# Patient Record
Sex: FEMALE
[De-identification: names, ages, dates, MRNs, and addresses within clinical notes are randomized per-mention and may not be internally consistent; named-entity substitution may affect disease eponyms.]

---

## 2019-04-17 ENCOUNTER — HOSPITAL ENCOUNTER (INPATIENT)
Dept: HOSPITAL 80 - FLD | Age: 39
LOS: 2 days | Discharge: HOME | DRG: 560 | End: 2019-04-19
Attending: ADVANCED PRACTICE MIDWIFE | Admitting: ADVANCED PRACTICE MIDWIFE
Payer: MEDICAID

## 2019-04-17 DIAGNOSIS — Z3A.39: ICD-10-CM

## 2019-04-17 NOTE — PDGENHP
History and Physical


History and Physical: 





PRENATAL CARE: [facility]





HPI: 


 Patient is a 38 yo G 1 @ 39.4 weeks aga who presents to L&D in active labor 

after SROM yesterday - clear fluid - at 1430 yesterday afternoon.  She declined 

IOL and preferred for labor to start on its own.  She started having regular 

contractions this afternoon at 1330 and she quickly became very uncomfortable.  

She was leaking clear fluid with some bloody show noted.  EFM reactive - so 

monitor removed for intermittent monitoring.  SVE  station.  


 EDC: 19 which is based on LMP: 18 which is known and consistent with 

Ultrasound at 7 weeks.  


 Her pregnancy is complicated by:  


 - AMA 





Review of Systems:


 Constitutional: Denies any fever, chills, or fatigue


 HEENT: denies any visual changes, difficulty swallowing, hearing loss


 Cardiovascular: Denies any chest pain, palpitations, leg swelling


 Respiratory: denies any cough, wheezing, or shortness of breathe


 GI: Denies any nausea, vomiting, diarrhea, constipation


 : denies any dysuria, urgency, frequency, vaginal bleeding


 Musculoskeletal: denies any muscle or bone pain


 Skin: denies any rashes


 Neuro: denies any headache, seizures, lightheadedness, dizziness, or loss of 

consciousness


 Psychiatric: denies any depression, anxiety, or SI/HI thoughts





HISTORY: 


 Previous OB history:  none


 Past medical history:  ASCU pap with neg colpo  - will repeat pp.  


 Past surgical history: none


 Medications: PNV 


 Allergies (list reaction): NKDA





PRENATAL LABS:


 Rh: O pos


 ABS: Neg


 Rubella: Immune


 HbsAg: NR


 HIV: NR


 VDRL: NR


 1hr:  102


 GC: Neg


 Chlamydia: Neg 


 GBS:  neg 








PHYSICAL EXAM:


 Constitutional: WN, A&Ox3


 Skin: pink, warm, dry


 HEENT: normocephalic atraumatic, supple


 Heart: RRR, no murmur


 Chest: CTA-B


 Abdomen: Soft, nontender, gravid


 SVE:  station


 Extremities: tr edema, negative raegan's sign


 Neuro: grossly normal


 Psych: normal affect





Fetal assessment: Reassuring FHTs, baseline 120s +accels, no decels, moderate 

variability 


 Contractions: toco q 2-3





Assessment: 


 1) 38 yo G 1  with IUP@ 39.4 weeks ega


 2) active labor


 3) GBS neg 


 4) Cat 1  FHR tracing





Plan: 


 1) Admit to L&D


 2) intermittent monitoring per policy 


 3) Pain control as patient desires 


 4) Anticipate imminent

## 2019-04-17 NOTE — OBDEL
Birth Info


Birth Type: Vaginal


Presentation at Delivery: Vertex


L&D Analgesia/Anesthesia Type: None


GBS+: No


Indications for Delivery: Spontaneous Labor





Vaginal Delivery





- Delivery Provider


Delivery Physician/CNM: Radha Saleh





- Labor and Delivery


Onset of Contractions Date: 19


Onset of Contractions Time: 13:30


Onset of Contractions Type: Spontaneous


Rupture of Membranes Date: 19


Rupture of Membranes Time: 14:30


Rupture of Membranes Type: Spontaneous


Amniotic Fluid Color: Clear


Dilation Complete Date: 19


Dilation Complete Time: 15:43


Placenta Delivery Date: 19


Placenta Delivery Time: 16:48


Total Hours of Labor: 3


Vaginal Sponge Count Correct: Yes


Vaginal Needle Count Correct: Yes


Vaginal Sweep Performed: Yes


EBL: 150


Delivery Events: None





- Medications


Labor Augmentation/Induction Methods Used: None





 Birth Data


HEATHER: 19


Gestational Age: 39 week(s) and 4 day(s)


  ** [place]


Delivery Date: 19


Delivery Time: 16:42


Sex of Infant: Female


Apgar Score (1 Min): 8


Apgar Score (5 Min): 9





ICD10 Worksheet


Patient Problems: 


 Problems











Problem Status Onset


 


AMA (advanced maternal age) primigravida 35+ Acute  


 


Vaginal delivery Acute  














- ICD10 Problem Qualifiers


(1) Vaginal delivery








(2) AMA (advanced maternal age) primigravida 35+

## 2019-04-18 RX ADMIN — DOCUSATE SODIUM PRN MG: 100 CAPSULE, LIQUID FILLED ORAL at 20:15

## 2019-04-18 RX ADMIN — ACETAMINOPHEN PRN MG: 325 TABLET ORAL at 13:38

## 2019-04-18 RX ADMIN — ACETAMINOPHEN PRN MG: 325 TABLET ORAL at 00:03

## 2019-04-18 RX ADMIN — IBUPROFEN PRN MG: 600 TABLET ORAL at 00:03

## 2019-04-18 RX ADMIN — IBUPROFEN PRN MG: 600 TABLET ORAL at 06:12

## 2019-04-18 RX ADMIN — IBUPROFEN PRN MG: 600 TABLET ORAL at 20:15

## 2019-04-18 RX ADMIN — IBUPROFEN PRN MG: 600 TABLET ORAL at 13:38

## 2019-04-18 RX ADMIN — ACETAMINOPHEN PRN MG: 325 TABLET ORAL at 06:12

## 2019-04-18 RX ADMIN — DOCUSATE SODIUM PRN MG: 100 CAPSULE, LIQUID FILLED ORAL at 09:28

## 2019-04-18 RX ADMIN — ACETAMINOPHEN PRN MG: 325 TABLET ORAL at 20:14

## 2019-04-18 NOTE — OBPP
PostPartum Progress Note


Assessment/Plan: 


Assessment:





1. First PP day





2. 











Plan:


1. lactation support


2. D/C home tomorrow


19 09:11





Subjective/Postpartum Course: 





19 09:11


pt feeling well.  Bleeding wnl. Voiding w/o difficulty. BF with minimal 

assistance. Pain management effective.


Objective: 





 











Temp Pulse Resp BP Pulse Ox


 


 37.2 C   68   16   106/59 L  95 


 


 19 22:30  19 22:30  19 22:30  19 22:30  19 22:30








VSS


Uterine Position/Fundal Height: At Umbilicus


Uterine Tone: Firm

## 2019-04-19 VITALS — SYSTOLIC BLOOD PRESSURE: 127 MMHG | DIASTOLIC BLOOD PRESSURE: 75 MMHG

## 2019-04-19 RX ADMIN — ACETAMINOPHEN PRN MG: 325 TABLET ORAL at 09:12

## 2019-04-19 RX ADMIN — IBUPROFEN PRN MG: 600 TABLET ORAL at 14:51

## 2019-04-19 RX ADMIN — IBUPROFEN PRN MG: 600 TABLET ORAL at 02:29

## 2019-04-19 RX ADMIN — IBUPROFEN PRN MG: 600 TABLET ORAL at 09:12

## 2019-04-19 RX ADMIN — DOCUSATE SODIUM PRN MG: 100 CAPSULE, LIQUID FILLED ORAL at 09:12

## 2019-04-19 NOTE — OBGCSDC
General Delivery Information





- General Info


: 1


Para: 1


Abortions: 0


Birth Type: Vaginal


L&D Analgesia/Anesthesia Type: None


Admission Date: 19





- Hospital Course


Postpartum: 





19 09:11


pt feeling well.  Bleeding wnl. Voiding w/o difficulty. BF with minimal 

assistance. Pain management effective.


19 09:00


S) Pt doing well, reports min pain and bleeding. she is ambulating and voiding 

without difficulty. She is breastfeeding. She desires discharge home today.


O) VSS, afebrile


constitutional: WNF, A&Ox3


HEENT: normocephalic, atraumatic, supple


Heart: RRR, No murmur


Chest: CTA-B


Breasts: soft, nontender,not engorged, nipples intact/normal


Abdomen:  Soft, nontender


Uterus:  Firm at U-2


Lochia:  Minimal rubra


Perineum:  Intact, healing well


Extremities:  Trace edema, and negative Kayleigh's sign


Neuro:  Grossly normal


A) 39-year-old 


S/P 


PPD#2


Breastfeeding


P)


Discharge home today


Continue breastfeeding


Pelvic rest x6wks


Discussed danger signs (infection, preeclampsia, depression, heavy bleeding, etc

)


RTO in 2/4/6 weeks





Vaginal Birth





- Delivery Provider


Delivery Physician/CNM: Radha Saleh





- Diagnosis


Labor: Spontaneous


Rupture of Membranes Type: Spontaneous


Amniotic Fluid Color: Clear


Delivery Events: None





 Birth





-  Delivery


EBL: 150





[place] Birth Data


HEATHER: 19


Gestational Age: 39 week(s) and 6 day(s)


  ** [place]


Delivery Date: 19


Delivery Time: 16:42


Sex of Infant: Female


[place] Weight (gm): 3652 g


Apgar Score (1 Min): 8


Apgar Score (5 Min): 9





Discharge Information





- Discharge Information


Condition: Good